# Patient Record
Sex: MALE | HISPANIC OR LATINO | URBAN - METROPOLITAN AREA
[De-identification: names, ages, dates, MRNs, and addresses within clinical notes are randomized per-mention and may not be internally consistent; named-entity substitution may affect disease eponyms.]

---

## 2024-05-09 ENCOUNTER — APPOINTMENT (RX ONLY)
Dept: URBAN - METROPOLITAN AREA CLINIC 124 | Facility: CLINIC | Age: 29
Setting detail: DERMATOLOGY
End: 2024-05-09

## 2024-05-09 DIAGNOSIS — R21 RASH AND OTHER NONSPECIFIC SKIN ERUPTION: ICD-10-CM | Status: INADEQUATELY CONTROLLED

## 2024-05-09 PROCEDURE — ? PRESCRIPTION

## 2024-05-09 PROCEDURE — 99204 OFFICE O/P NEW MOD 45 MIN: CPT

## 2024-05-09 PROCEDURE — ? DIAGNOSIS COMMENT

## 2024-05-09 PROCEDURE — ? COUNSELING

## 2024-05-09 RX ORDER — FLUOCINONIDE 0.5 MG/G
1 CREAM TOPICAL BID
Qty: 120 | Refills: 1 | Status: ERX | COMMUNITY
Start: 2024-05-09

## 2024-05-09 RX ADMIN — FLUOCINONIDE 1: 0.5 CREAM TOPICAL at 00:00

## 2024-05-09 ASSESSMENT — LOCATION ZONE DERM: LOCATION ZONE: LEG

## 2024-05-09 ASSESSMENT — LOCATION DETAILED DESCRIPTION DERM
LOCATION DETAILED: LEFT DISTAL PRETIBIAL REGION
LOCATION DETAILED: RIGHT PROXIMAL PRETIBIAL REGION

## 2024-05-09 ASSESSMENT — LOCATION SIMPLE DESCRIPTION DERM
LOCATION SIMPLE: RIGHT PRETIBIAL REGION
LOCATION SIMPLE: LEFT PRETIBIAL REGION

## 2024-05-09 NOTE — PROCEDURE: DIAGNOSIS COMMENT
Render Risk Assessment In Note?: no
Comment: Pt aware we will keep punch biopsy vs BW vs Opzelura in reserve
Detail Level: Simple

## 2024-06-03 ENCOUNTER — APPOINTMENT (RX ONLY)
Dept: URBAN - METROPOLITAN AREA CLINIC 124 | Facility: CLINIC | Age: 29
Setting detail: DERMATOLOGY
End: 2024-06-03

## 2024-06-03 DIAGNOSIS — D18.0 HEMANGIOMA: ICD-10-CM

## 2024-06-03 DIAGNOSIS — R21 RASH AND OTHER NONSPECIFIC SKIN ERUPTION: ICD-10-CM | Status: RESOLVING

## 2024-06-03 PROBLEM — D18.01 HEMANGIOMA OF SKIN AND SUBCUTANEOUS TISSUE: Status: ACTIVE | Noted: 2024-06-03

## 2024-06-03 PROCEDURE — ? COUNSELING

## 2024-06-03 PROCEDURE — ? DIAGNOSIS COMMENT

## 2024-06-03 PROCEDURE — 99214 OFFICE O/P EST MOD 30 MIN: CPT

## 2024-06-03 PROCEDURE — ? PRESCRIPTION

## 2024-06-03 PROCEDURE — ? PRESCRIPTION MEDICATION MANAGEMENT

## 2024-06-03 RX ORDER — TACROLIMUS 1 MG/G
OINTMENT TOPICAL
Qty: 60 | Refills: 1 | Status: ERX | COMMUNITY
Start: 2024-06-03

## 2024-06-03 RX ADMIN — TACROLIMUS: 1 OINTMENT TOPICAL at 00:00

## 2024-06-03 ASSESSMENT — LOCATION ZONE DERM: LOCATION ZONE: LEG

## 2024-06-03 ASSESSMENT — LOCATION DETAILED DESCRIPTION DERM
LOCATION DETAILED: RIGHT PROXIMAL PRETIBIAL REGION
LOCATION DETAILED: LEFT DISTAL PRETIBIAL REGION
LOCATION DETAILED: RIGHT DISTAL PRETIBIAL REGION

## 2024-06-03 ASSESSMENT — LOCATION SIMPLE DESCRIPTION DERM
LOCATION SIMPLE: LEFT PRETIBIAL REGION
LOCATION SIMPLE: RIGHT PRETIBIAL REGION

## 2024-06-03 NOTE — PROCEDURE: PRESCRIPTION MEDICATION MANAGEMENT
Render In Strict Bullet Format?: No
Initiate Treatment: Tacrolimus ointment bid x 2 weeks.
Continue Regimen: Fluocinonide cream bid x 2 weeks after tacrolimus ointment.
Plan: Biopsy and BW would be consider at the next visit.
Detail Level: Zone